# Patient Record
Sex: FEMALE | Race: ASIAN | NOT HISPANIC OR LATINO | Employment: FULL TIME | URBAN - METROPOLITAN AREA
[De-identification: names, ages, dates, MRNs, and addresses within clinical notes are randomized per-mention and may not be internally consistent; named-entity substitution may affect disease eponyms.]

---

## 2018-09-07 ENCOUNTER — HOSPITAL ENCOUNTER (OUTPATIENT)
Dept: RADIOLOGY | Facility: HOSPITAL | Age: 58
Discharge: HOME | End: 2018-09-07
Attending: ORTHOPAEDIC SURGERY
Payer: COMMERCIAL

## 2018-09-07 ENCOUNTER — OFFICE VISIT (OUTPATIENT)
Dept: ORTHOPEDICS | Facility: CLINIC | Age: 58
End: 2018-09-07
Payer: COMMERCIAL

## 2018-09-07 VITALS
DIASTOLIC BLOOD PRESSURE: 78 MMHG | SYSTOLIC BLOOD PRESSURE: 108 MMHG | HEART RATE: 77 BPM | HEIGHT: 62 IN | BODY MASS INDEX: 29.44 KG/M2 | WEIGHT: 160 LBS

## 2018-09-07 DIAGNOSIS — M54.16 LUMBAR RADICULOPATHY: ICD-10-CM

## 2018-09-07 DIAGNOSIS — R52 PAIN: Primary | ICD-10-CM

## 2018-09-07 DIAGNOSIS — M70.62 TROCHANTERIC BURSITIS OF LEFT HIP: Primary | ICD-10-CM

## 2018-09-07 DIAGNOSIS — R52 PAIN: ICD-10-CM

## 2018-09-07 DIAGNOSIS — M25.562 KNEE PAIN, LEFT ANTERIOR: ICD-10-CM

## 2018-09-07 DIAGNOSIS — M54.5 LOW BACK PAIN, UNSPECIFIED BACK PAIN LATERALITY, UNSPECIFIED CHRONICITY, WITH SCIATICA PRESENCE UNSPECIFIED: ICD-10-CM

## 2018-09-07 PROCEDURE — 73564 X-RAY EXAM KNEE 4 OR MORE: CPT | Mod: 26,RT,, | Performed by: RADIOLOGY

## 2018-09-07 PROCEDURE — 99999 PR PBB SHADOW E&M-NEW PATIENT-LVL III: CPT | Mod: PBBFAC,,, | Performed by: ORTHOPAEDIC SURGERY

## 2018-09-07 PROCEDURE — 99204 OFFICE O/P NEW MOD 45 MIN: CPT | Mod: S$GLB,,, | Performed by: ORTHOPAEDIC SURGERY

## 2018-09-07 PROCEDURE — 73564 X-RAY EXAM KNEE 4 OR MORE: CPT | Mod: 26,LT,, | Performed by: RADIOLOGY

## 2018-09-07 PROCEDURE — 3008F BODY MASS INDEX DOCD: CPT | Mod: CPTII,S$GLB,, | Performed by: ORTHOPAEDIC SURGERY

## 2018-09-07 PROCEDURE — 73564 X-RAY EXAM KNEE 4 OR MORE: CPT | Mod: TC,50

## 2018-09-07 RX ORDER — GLUCOSAM/CHONDR-MSM6/MANGANESE 467-438 MG
1 CAPSULE ORAL
COMMUNITY
Start: 2018-07-26

## 2018-09-07 RX ORDER — POLYETHYLENE GLYCOL 3350 17 G/17G
POWDER, FOR SOLUTION ORAL
Refills: 5 | COMMUNITY
Start: 2018-08-26

## 2018-09-07 RX ORDER — PANTOPRAZOLE SODIUM 40 MG/1
40 TABLET, DELAYED RELEASE ORAL DAILY
Refills: 5 | COMMUNITY
Start: 2018-08-26

## 2018-09-07 RX ORDER — CIPROFLOXACIN 500 MG/1
TABLET ORAL
Refills: 0 | COMMUNITY
Start: 2018-07-30 | End: 2018-09-07

## 2018-09-07 RX ORDER — POLYETHYLENE GLYCOL 3350 17 G/17G
17 POWDER, FOR SOLUTION ORAL
COMMUNITY
Start: 2018-07-26

## 2018-09-07 RX ORDER — HYDROCORTISONE 25 MG/G
CREAM TOPICAL
COMMUNITY
Start: 2018-07-26 | End: 2019-07-26

## 2018-09-07 RX ORDER — PANTOPRAZOLE SODIUM 40 MG/1
40 TABLET, DELAYED RELEASE ORAL
COMMUNITY
Start: 2018-07-26 | End: 2019-01-22

## 2018-09-07 RX ORDER — HYDROCORTISONE 25 MG/G
1 CREAM TOPICAL 2 TIMES DAILY
Refills: 0 | COMMUNITY
Start: 2018-07-30

## 2018-09-07 NOTE — PROGRESS NOTES
"Subjective:     Patient ID: Cory Lora is a 58 y.o. female.    Chief Complaint: Pain of the Left Knee      She is here for "left knee pain" and here with her daughter.  They note no specific injury but she has had pain that starts the posterior aspect of the left hip that radiates down the leg all the way down to the calf.  Not necessarily associated with numbness and tingling but mostly just pain.      Knee Pain    The pain is present in the left knee. The pain radiates to the left foot. This is a new problem. The current episode started more than 1 year ago (almost 2 years). The injury was the result of a action while at home. Movement associated with injury: unknown.The problem occurs rarely (only when standing and walking). The problem has been unchanged. The quality of the pain is described as aching, shooting, sharp and throbbing. The pain is at a severity of 6/10. Pertinent negatives include no fever or itching. The symptoms are aggravated by activity, bearing weight, bending, walking and standing (standing up). Physical therapy was not tried.      No past medical history on file.  No past surgical history on file.  No family history on file.  Social History     Socioeconomic History    Marital status:      Spouse name: Not on file    Number of children: Not on file    Years of education: Not on file    Highest education level: Not on file   Social Needs    Financial resource strain: Not on file    Food insecurity - worry: Not on file    Food insecurity - inability: Not on file    Transportation needs - medical: Not on file    Transportation needs - non-medical: Not on file   Occupational History    Not on file   Tobacco Use    Smoking status: Never Smoker    Smokeless tobacco: Never Used   Substance and Sexual Activity    Alcohol use: No     Frequency: Never    Drug use: No    Sexual activity: Not on file   Other Topics Concern    Not on file   Social History Narrative    Not on file "        Medication List           Accurate as of 9/7/18  1:52 PM. If you have any questions, ask your nurse or doctor.               CONTINUE taking these medications    glucosam-chondr msm6-manganese 467-438-0.7 mg Cap     * hydrocortisone 2.5 % cream     * hydrocortisone 2.5 % cream     * pantoprazole 40 MG tablet  Commonly known as:  PROTONIX     * pantoprazole 40 MG tablet  Commonly known as:  PROTONIX     * polyethylene glycol 17 gram/dose powder  Commonly known as:  GLYCOLAX     * polyethylene glycol 17 gram/dose powder  Commonly known as:  GLYCOLAX         * This list has 6 medication(s) that are the same as other medications prescribed for you. Read the directions carefully, and ask your doctor or other care provider to review them with you.            STOP taking these medications    ciprofloxacin HCl 500 MG tablet  Commonly known as:  CIPRO  Stopped by:  Campos Conti MD          Review of patient's allergies indicates:  Allergies not on file  Review of Systems   Constitution: Negative for fever.   HENT: Negative for sore throat.    Eyes: Negative for blurred vision.   Cardiovascular: Negative for dyspnea on exertion.   Respiratory: Negative for shortness of breath.    Hematologic/Lymphatic: Does not bruise/bleed easily.   Skin: Negative for itching.   Musculoskeletal: Positive for joint pain and joint swelling.   Gastrointestinal: Negative for vomiting.   Genitourinary: Negative for dysuria.   Neurological: Negative for dizziness.   Psychiatric/Behavioral: The patient does not have insomnia.        Objective:   Body mass index is 29.26 kg/m².  Vitals:    09/07/18 0906   BP: 108/78   Pulse: 77           General    Nursing note and vitals reviewed.  Constitutional: She is oriented to person, place, and time. She appears well-developed. No distress.   HENT:   Head: Normocephalic and atraumatic.   Eyes: EOM are normal.   Cardiovascular: Normal rate.    Pulmonary/Chest: Effort normal. No stridor.    Neurological: She is alert and oriented to person, place, and time.   Psychiatric: She has a normal mood and affect. Her behavior is normal.     General Musculoskeletal Exam   Gait: normal       Right Knee Exam     Inspection   Scars: absent  Effusion: effusion    Range of Motion   Extension: 0   Flexion: 120     Left Knee Exam     Inspection   Scars: absent  Effusion: absent    Tenderness   The patient tender to palpation of the patella.    Range of Motion   Extension: 0   Flexion: 120     Tests   Meniscus   Ivet:  Medial - negative Lateral - negative  Stability Lachman: normal (-1 to 2mm) PCL-Posterior Drawer: normal (0 to 2mm)  MCL - Valgus: normal (0 to 2mm)  LCL - Varus: normal (0 to 2mm)  Patella   Patellar Tracking: normal  J-Sign: J sign absent    Other   Sensation: normal    Comments:  WWP foot    Right Hip Exam     Range of Motion   Flexion: 90   External rotation: 50   Internal rotation: 10     Tests   Pain w/ forced internal rotation (LADARIUS): absent  Pain w/ forced external rotation (FADIR): absent  Log Roll: negative  Left Hip Exam     Tenderness   The patient tender to palpation of the trochanteric bursa.    Range of Motion   Flexion: 90   External rotation: 40   Internal rotation: 10     Tests   Pain w/ forced internal rotation (LADARIUS): present  Log Roll: negative      Back (L-Spine & T-Spine) / Neck (C-Spine) Exam     Comments:  Negative straight leg raise      Muscle Strength   Right Lower Extremity   Right quadriceps strength: good quad tone.   Left Lower Extremity   Left quadriceps strength: good quad tone.       IMAGING   EXAMINATION:  XR KNEE ORTHO BILAT WITH FLEXION    CLINICAL HISTORY:  Pain, unspecified    TECHNIQUE:  AP standing of both knees, PA flexion standing views of both knees, and Merchant views of both knees were performed.  Lateral views of both knees were also performed.    COMPARISON  None    FINDINGS:  There is mild-to-moderate medial compartment joint space narrowing right  greater than the left with minimal marginal osteophyte formation seen on the right.  Minimal lateral subluxation of either patella noted on the sunrise view.  No joint effusions are suggested.  No acute fracture or dislocation.   Impression       1.  As above      Electronically signed by: Arron Adams DO  Date: 09/07/2018  Time: 09:17     Radiographs / Imaging : Results reviewed by me and interpreted by me, discussed with the patient and / or family       Assessment:     Encounter Diagnoses   Name Primary?    Trochanteric bursitis of left hip Yes    Low back pain, unspecified back pain laterality, unspecified chronicity, with sciatica presence unspecified     Lumbar radiculopathy     Knee pain, left anterior         Plan:       -recommend PT for lower back / sciatic type pain  -evaluation by pain management for sciatica  -OTC aleve  -hold any injection to left knee - I do think her Lower back is more symptomatic  -she does likely have some degree of trochanteric bursitis and IT band irritation, start stretching program with PT    If knee pain comes to light then recommend CSI for diagnostic and therapeutic purposes

## 2018-09-19 ENCOUNTER — CLINICAL SUPPORT (OUTPATIENT)
Dept: REHABILITATION | Facility: HOSPITAL | Age: 58
End: 2018-09-19
Attending: ORTHOPAEDIC SURGERY
Payer: COMMERCIAL

## 2018-09-19 DIAGNOSIS — M70.62 TROCHANTERIC BURSITIS OF LEFT HIP: Primary | ICD-10-CM

## 2018-09-19 DIAGNOSIS — M54.5 LOW BACK PAIN, UNSPECIFIED BACK PAIN LATERALITY, UNSPECIFIED CHRONICITY, WITH SCIATICA PRESENCE UNSPECIFIED: ICD-10-CM

## 2018-09-19 PROCEDURE — 97161 PT EVAL LOW COMPLEX 20 MIN: CPT

## 2018-09-19 PROCEDURE — 97110 THERAPEUTIC EXERCISES: CPT

## 2018-09-19 NOTE — PROGRESS NOTES
PHYSICAL THERAPY INITIAL OUTPATIENT EVALUATION    Referring Provider:  Dr. Campos Conti    Diagnosis:       ICD-10-CM ICD-9-CM    1. Trochanteric bursitis of left hip M70.62 726.5    2. Low back pain, unspecified back pain laterality, unspecified chronicity, with sciatica presence unspecified M54.5 724.2        Orders:  Evaluate and Treat    Date of Initial Evaluation:  18      Orders :  19    PCoding Cycle Visit # 1    SUBJECTIVE:  Patient reports pain for 6 months - 1 year.  Pt. Reports that pain came on all of a sudden.  Pt. Reports that she does sit a lot at work.  Pt. Reports pain in the mornings.  Pt. Reports discomfort with sit to stand and with initial steps walking. Pt. Reports that pain radiates from L lower glut to bottom of L foot.    Past Medical History:  No past medical history on file.    Problem List:  There is no problem list on file for this patient.      Current Medications:    Current Outpatient Medications:     glucosam-chondr msm6-manganese 467-438-0.7 mg Cap, Take 1 each by mouth., Disp: , Rfl:     hydrocortisone 2.5 % cream, 1 application 2 (two) times daily. Apply to affected area, Disp: , Rfl: 0    hydrocortisone 2.5 % cream, Apply topically., Disp: , Rfl:     pantoprazole (PROTONIX) 40 MG tablet, Take 40 mg by mouth once daily., Disp: , Rfl: 5    pantoprazole (PROTONIX) 40 MG tablet, Take 40 mg by mouth., Disp: , Rfl:     polyethylene glycol (GLYCOLAX) 17 gram/dose powder, DISSOLVE CONTENTS OF 1 CAPFUL IN WATER OR LIQUID AND TAKE BY MOUTH ONCE DAILY IF NEEDED FOR CONSTIP, Disp: , Rfl: 5    polyethylene glycol (GLYCOLAX) 17 gram/dose powder, Take 17 g by mouth., Disp: , Rfl:     OBJECTIVE:  Pain: 5-6/10    Sensation:  mild decreased L3     Lumbar ROM: Forward Bending   WNL little pain       Backwardbending  WNL     Sidebend Right  WNL      Sidebend Left   WNL pain radiates down L LE      Rotation Right   WNL     Rotation Left   WNL    Hip ROM: WNL except B hip  ext; mild hip flexion tightness about 5 degrees from neutral, decrease L hip flexion passively              R  L  Strength:  Gluteus Medius   4+/5  3+/5     Psoas    4/5  4/5P!     TFL    5/5  4/5     Quadriceps   5/5  1+/5     HS    5/5  4/5     Glut Max   5/5  1+/5 pain/cramp      Rectus Abdominis  NT     Anterior Tibialis  5/5  5/5     Gastrocnemius  5/5  5/5     Transverse Abdominis NT    Function:   Patient reports 6% disability on the Modified Oswestry Low Back Pain Disability Questionnaire.    Other: HS 90/90 R:L 12:22 degrees; +L Slump Test; Neg. L SLR Test.  Neg. Lower limb nerve test     ASSESSMENT:  The patient is a 58 y.o. year old female who presents to physical therapy with complaints of low back pain with sciatica and greater trochanteric bursitis.  Impairments include pain with forward bending and L side bending, increased L HS tightness, decreased B hip extension, mild to moderate weakness in B LE, + L Slump Test indicative of sciatic nerve impairment.  Impairments limit patient with initial walking and sit to stand transfers.  Will continue to monitor hip for pain or limitations.  Pt. Shows good rehab potential.  Pt. Will benefit from skilled PT to increase L LE flexibility to decrease neural symptoms and increase L LE strength to perform functional activities without pain.    There are no Co-morbidities which may impact the plan of care and potentially impede the patient's progress in therapy.    The patient's clinical presentation is stable.  Based on patient's stable clinical presentation, 0 co-morbidities, and examination of 2 body systems, patient presents with low complexity.    Short Term Goals:  (2-3 weeks)  1.  Patient will decrease sciatic pain to less than 3/10.  2.  Patient will increase L HS 90/90 to 15 degrees.  3.  Patient will have full B hip extension.      Long Term Goals:  (4-5 weeks)  1.  Patient will perform lumbar AROM in all planes of motion for reaching activities without  pain.  2.  Patient will increase B LE strength to 5/5 to transfer sit to stand and perform standing activities without discomfort.  3.  Patient will have 5/5 core strength to assist with proper lifting techniques.      TREATMENT PROVIDED:    Initial evaluation completed.    Manual Therapy:  N/A    Therapeutic Exercise:  Sciatic nerve glides 4 min., piriformis stretch x 2 min., hip adductor stretch x 2min., HS stretch x 2 min., gastroc stretch x 2 min.,     Modalities: moist heat to L gluteal and L posterior LE      PLAN:  Patient will benefit from physical therapy (1-2) x/week for (4-5) weeks including manual therapy, therapeutic exercise, functional activities, modalities, and patient education.    Thank you for this referral.    These services are reasonable and necessary for the conditions set forth above while under my care.

## 2018-09-27 ENCOUNTER — TELEPHONE (OUTPATIENT)
Dept: REHABILITATION | Facility: HOSPITAL | Age: 58
End: 2018-09-27

## 2018-10-12 NOTE — PROGRESS NOTES
PHYSICAL THERAPY Daily Note    Referring Provider:  Dr. Campos Conti    Diagnosis:       ICD-10-CM ICD-9-CM    1. Trochanteric bursitis of left hip M70.62 726.5    2. Low back pain, unspecified back pain laterality, unspecified chronicity, with sciatica presence unspecified M54.5 724.2        Orders:  Evaluate and Treat    Date of Initial Evaluation:  18      Orders :  19    PCoding Cycle Visit # 2    SUBJECTIVE:  Pt. Reports not feeling much difference yet.  Pt. Reports discomfort with sit to stand and amb slowly after standing with pain with first standing.  Pt. Reports having limited visits  Approved for PT so trying to not use the visits too fast.       Past Medical History:  No past medical history on file.    Problem List:  There is no problem list on file for this patient.      Current Medications:    Current Outpatient Medications:     glucosam-chondr msm6-manganese 467-438-0.7 mg Cap, Take 1 each by mouth., Disp: , Rfl:     hydrocortisone 2.5 % cream, 1 application 2 (two) times daily. Apply to affected area, Disp: , Rfl: 0    hydrocortisone 2.5 % cream, Apply topically., Disp: , Rfl:     pantoprazole (PROTONIX) 40 MG tablet, Take 40 mg by mouth once daily., Disp: , Rfl: 5    pantoprazole (PROTONIX) 40 MG tablet, Take 40 mg by mouth., Disp: , Rfl:     polyethylene glycol (GLYCOLAX) 17 gram/dose powder, DISSOLVE CONTENTS OF 1 CAPFUL IN WATER OR LIQUID AND TAKE BY MOUTH ONCE DAILY IF NEEDED FOR CONSTIP, Disp: , Rfl: 5    polyethylene glycol (GLYCOLAX) 17 gram/dose powder, Take 17 g by mouth., Disp: , Rfl:     OBJECTIVE:  Pain: 5/10      ASSESSMENT:  Pt. Reports tenderness L sciatic notch region.  Pt. Had discomfort with L hip ext, abd and internal rotation- manual mob began.  Pt. Required cues to perform sciatic nerve glides and therex correctly.  Added hip abd strengthening, quad stretching, and deep abdominal strengthening to help decrease pain.  Cont. POC.    TREATMENT  PROVIDED:        Manual Therapy:  L piriformis release x 5 min.,  L sciatic nerve mobilization x 4 min., L hip post. Mob x 2 min., L hip lateral mob x 2min., L hip int. Rot mob x 2min.      Therapeutic Exercise:  Sciatic nerve glides 2 min., piriformis stretch x 2 min., hip adductor stretch x 2min., HS stretch x 2 min., gastroc stretch x 2 min., L hip abd x 2min., PPT x 2min., L quad stretch x 2min. , Nu Step x 2 min.    Modalities: e-stim with moist heat to  lumbar/gluteal region  X 15 min.    Self-Care: HEP given      PLAN:  Patient will benefit from physical therapy (1-2) x/week for (4-5) weeks including manual therapy, therapeutic exercise, functional activities, modalities, and patient education.    Thank you for this referral.    These services are reasonable and necessary for the conditions set forth above while under my care.

## 2018-10-15 ENCOUNTER — CLINICAL SUPPORT (OUTPATIENT)
Dept: REHABILITATION | Facility: HOSPITAL | Age: 58
End: 2018-10-15
Attending: ORTHOPAEDIC SURGERY
Payer: COMMERCIAL

## 2018-10-15 DIAGNOSIS — M54.5 LOW BACK PAIN, UNSPECIFIED BACK PAIN LATERALITY, UNSPECIFIED CHRONICITY, WITH SCIATICA PRESENCE UNSPECIFIED: ICD-10-CM

## 2018-10-15 DIAGNOSIS — M70.62 TROCHANTERIC BURSITIS OF LEFT HIP: Primary | ICD-10-CM

## 2018-10-15 PROCEDURE — 97110 THERAPEUTIC EXERCISES: CPT

## 2018-10-15 PROCEDURE — 97140 MANUAL THERAPY 1/> REGIONS: CPT

## 2018-10-15 PROCEDURE — 97014 ELECTRIC STIMULATION THERAPY: CPT

## 2018-12-04 ENCOUNTER — DOCUMENTATION ONLY (OUTPATIENT)
Dept: REHABILITATION | Facility: HOSPITAL | Age: 58
End: 2018-12-04